# Patient Record
Sex: FEMALE | Race: WHITE | NOT HISPANIC OR LATINO | URBAN - METROPOLITAN AREA
[De-identification: names, ages, dates, MRNs, and addresses within clinical notes are randomized per-mention and may not be internally consistent; named-entity substitution may affect disease eponyms.]

---

## 2017-10-23 ENCOUNTER — IMPORTED ENCOUNTER (OUTPATIENT)
Dept: URBAN - METROPOLITAN AREA CLINIC 50 | Facility: CLINIC | Age: 67
End: 2017-10-23

## 2017-10-25 ENCOUNTER — IMPORTED ENCOUNTER (OUTPATIENT)
Dept: URBAN - METROPOLITAN AREA CLINIC 50 | Facility: CLINIC | Age: 67
End: 2017-10-25

## 2018-11-28 ENCOUNTER — IMPORTED ENCOUNTER (OUTPATIENT)
Dept: URBAN - METROPOLITAN AREA CLINIC 50 | Facility: CLINIC | Age: 68
End: 2018-11-28

## 2019-12-04 ENCOUNTER — IMPORTED ENCOUNTER (OUTPATIENT)
Dept: URBAN - METROPOLITAN AREA CLINIC 50 | Facility: CLINIC | Age: 69
End: 2019-12-04

## 2019-12-04 NOTE — PATIENT DISCUSSION
"""Both nevi are flat and stable. Discussed with patient. Recommend hat/sunglasses.  Will monitor."""

## 2020-12-09 ENCOUNTER — IMPORTED ENCOUNTER (OUTPATIENT)
Dept: URBAN - METROPOLITAN AREA CLINIC 50 | Facility: CLINIC | Age: 70
End: 2020-12-09

## 2021-04-18 ASSESSMENT — VISUAL ACUITY
OS_CC: 20/25-
OS_CC: J1+@ 16 IN
OD_BAT: 20/25
OD_BAT: 20/40
OS_CC: 20/25
OD_OTHER: 20/25. 20/30.
OD_CC: 20/25-2
OS_CC: J1+
OS_OTHER: 20/25-2. 20/30.
OS_BAT: 20/25
OD_CC: J1@ 16 IN
OS_OTHER: 20/30. 20/40.
OD_BAT: 20/25
OS_CC: J1@ 16 IN
OD_CC: J1+
OS_OTHER: 20/25. 20/30.
OD_CC: 20/25+2
OD_OTHER: 20/40. 20/60.
OS_OTHER: 20/40. 20/50.
OS_CC: J1+
OD_CC: J1+@ 16 IN
OS_BAT: 20/25-2
OS_CC: 20/20
OD_CC: J1+
OD_OTHER: 20/25. 20/40.
OD_CC: 20/25-
OD_OTHER: 20/40. 20/50.
OS_CC: 20/25-2
OS_BAT: 20/40
OD_CC: 20/25+
OS_BAT: 20/30
OD_BAT: 20/40

## 2021-04-18 ASSESSMENT — TONOMETRY
OD_IOP_MMHG: 14
OS_IOP_MMHG: 15
OD_IOP_MMHG: 15
OS_IOP_MMHG: 15
OD_IOP_MMHG: 14
OD_IOP_MMHG: 14
OS_IOP_MMHG: 14
OS_IOP_MMHG: 14

## 2021-12-13 ENCOUNTER — PREPPED CHART (OUTPATIENT)
Dept: URBAN - METROPOLITAN AREA CLINIC 49 | Facility: CLINIC | Age: 71
End: 2021-12-13

## 2021-12-13 NOTE — PATIENT DISCUSSION
"""Follow nevus for change in size/shape.  UV protection d/w patient today.""." [0] : 1) Little interest or pleasure doing things: Not at all (0) [MammogramComments] : hasn't had in a while [PapSmearComments] : due now [ColonoscopyComments] : not yet

## 2021-12-15 ENCOUNTER — COMPREHENSIVE EXAM (OUTPATIENT)
Dept: URBAN - METROPOLITAN AREA CLINIC 49 | Facility: CLINIC | Age: 71
End: 2021-12-15

## 2021-12-15 DIAGNOSIS — D31.31: ICD-10-CM

## 2021-12-15 DIAGNOSIS — H35.371: ICD-10-CM

## 2021-12-15 DIAGNOSIS — H02.834: ICD-10-CM

## 2021-12-15 DIAGNOSIS — H02.831: ICD-10-CM

## 2021-12-15 DIAGNOSIS — D31.32: ICD-10-CM

## 2021-12-15 DIAGNOSIS — H25.13: ICD-10-CM

## 2021-12-15 PROCEDURE — 92014 COMPRE OPH EXAM EST PT 1/>: CPT

## 2021-12-15 PROCEDURE — 92134 CPTRZ OPH DX IMG PST SGM RTA: CPT

## 2021-12-15 PROCEDURE — 92015 DETERMINE REFRACTIVE STATE: CPT

## 2021-12-15 ASSESSMENT — VISUAL ACUITY
OU_SC: J1
OD_GLARE: 20/25
OS_CC: 20/30+1
OS_GLARE: 20/30
OD_GLARE: 20/40
OD_PH: 20/30
OD_CC: 20/30-1
OS_GLARE: 20/30

## 2021-12-15 ASSESSMENT — TONOMETRY
OS_IOP_MMHG: 15
OD_IOP_MMHG: 15

## 2022-12-21 ENCOUNTER — COMPREHENSIVE EXAM (OUTPATIENT)
Dept: URBAN - METROPOLITAN AREA CLINIC 49 | Facility: CLINIC | Age: 72
End: 2022-12-21

## 2022-12-21 PROCEDURE — 92134 CPTRZ OPH DX IMG PST SGM RTA: CPT

## 2022-12-21 PROCEDURE — 92014 COMPRE OPH EXAM EST PT 1/>: CPT

## 2022-12-21 PROCEDURE — 92015 DETERMINE REFRACTIVE STATE: CPT

## 2022-12-21 ASSESSMENT — VISUAL ACUITY
OD_GLARE: 20/25
OD_GLARE: 20/50
OS_GLARE: 20/30
OS_GLARE: 20/50
OU_CC: 20/25
OU_CC: J1+ @ 15IN
OS_CC: 20/30-1
OD_CC: 20/30-1

## 2022-12-21 ASSESSMENT — TONOMETRY
OD_IOP_MMHG: 15
OS_IOP_MMHG: 14

## 2022-12-21 NOTE — PATIENT DISCUSSION
Patient is starting to become bothered by lids. Wants to scheduled testing in the spring. Interested in having surgery in the summer.

## 2023-06-05 ENCOUNTER — DIAGNOSTICS ONLY (OUTPATIENT)
Dept: URBAN - METROPOLITAN AREA CLINIC 49 | Facility: CLINIC | Age: 73
End: 2023-06-05

## 2023-06-05 DIAGNOSIS — H02.834: ICD-10-CM

## 2023-06-05 DIAGNOSIS — H02.831: ICD-10-CM

## 2023-06-05 PROCEDURE — 92285 EXTERNAL OCULAR PHOTOGRAPHY: CPT

## 2023-06-05 PROCEDURE — 92082 INTERMEDIATE VISUAL FIELD XM: CPT

## 2024-03-14 ENCOUNTER — COMPREHENSIVE EXAM (OUTPATIENT)
Dept: URBAN - METROPOLITAN AREA CLINIC 53 | Facility: CLINIC | Age: 74
End: 2024-03-14

## 2024-03-14 DIAGNOSIS — H52.4: ICD-10-CM

## 2024-03-14 DIAGNOSIS — H02.831: ICD-10-CM

## 2024-03-14 DIAGNOSIS — D31.32: ICD-10-CM

## 2024-03-14 DIAGNOSIS — H04.122: ICD-10-CM

## 2024-03-14 DIAGNOSIS — H35.371: ICD-10-CM

## 2024-03-14 DIAGNOSIS — H02.834: ICD-10-CM

## 2024-03-14 DIAGNOSIS — H25.13: ICD-10-CM

## 2024-03-14 DIAGNOSIS — D31.31: ICD-10-CM

## 2024-03-14 PROCEDURE — 99214 OFFICE O/P EST MOD 30 MIN: CPT

## 2024-03-14 PROCEDURE — 92134 CPTRZ OPH DX IMG PST SGM RTA: CPT

## 2024-03-14 PROCEDURE — 92015 DETERMINE REFRACTIVE STATE: CPT

## 2024-03-14 ASSESSMENT — VISUAL ACUITY
OD_CC: J1+@14"
OD_CC: 20/25
OS_GLARE: 20/25
OS_GLARE: 20/30
OS_CC: J1+@14"
OD_GLARE: 20/20
OD_GLARE: 20/25
OU_CC: 20/20-2
OS_CC: 20/20-2
OU_CC: J1+@14"

## 2024-03-14 ASSESSMENT — KERATOMETRY
OD_AXISANGLE_DEGREES: 25
OS_K2POWER_DIOPTERS: 42.00
OD_K1POWER_DIOPTERS: 41.75
OS_AXISANGLE2_DEGREES: 57
OD_AXISANGLE2_DEGREES: 115
OS_AXISANGLE_DEGREES: 147
OD_K2POWER_DIOPTERS: 42.25
OS_K1POWER_DIOPTERS: 41.50

## 2024-03-14 ASSESSMENT — TONOMETRY
OS_IOP_MMHG: 15
OD_IOP_MMHG: 15

## 2025-03-24 ENCOUNTER — COMPREHENSIVE EXAM (OUTPATIENT)
Age: 75
End: 2025-03-24

## 2025-03-24 DIAGNOSIS — H25.13: ICD-10-CM

## 2025-03-24 DIAGNOSIS — H52.4: ICD-10-CM

## 2025-03-24 DIAGNOSIS — H43.391: ICD-10-CM

## 2025-03-24 DIAGNOSIS — H04.122: ICD-10-CM

## 2025-03-24 DIAGNOSIS — H02.831: ICD-10-CM

## 2025-03-24 DIAGNOSIS — H35.371: ICD-10-CM

## 2025-03-24 DIAGNOSIS — H02.834: ICD-10-CM

## 2025-03-24 DIAGNOSIS — D31.31: ICD-10-CM

## 2025-03-24 DIAGNOSIS — D31.32: ICD-10-CM

## 2025-03-24 PROCEDURE — 92134 CPTRZ OPH DX IMG PST SGM RTA: CPT

## 2025-03-24 PROCEDURE — 99214 OFFICE O/P EST MOD 30 MIN: CPT

## 2025-03-24 PROCEDURE — 92015 DETERMINE REFRACTIVE STATE: CPT
